# Patient Record
Sex: FEMALE | Race: WHITE
[De-identification: names, ages, dates, MRNs, and addresses within clinical notes are randomized per-mention and may not be internally consistent; named-entity substitution may affect disease eponyms.]

---

## 2018-06-24 ENCOUNTER — HOSPITAL ENCOUNTER (OUTPATIENT)
Dept: HOSPITAL 89 - OB | Age: 23
Discharge: HOME | End: 2018-06-24
Attending: OBSTETRICS & GYNECOLOGY
Payer: COMMERCIAL

## 2018-06-24 VITALS
BODY MASS INDEX: 33.33 KG/M2 | HEIGHT: 69 IN | BODY MASS INDEX: 33.33 KG/M2 | WEIGHT: 225 LBS | WEIGHT: 225 LBS | HEIGHT: 69 IN

## 2018-06-24 VITALS — DIASTOLIC BLOOD PRESSURE: 83 MMHG | SYSTOLIC BLOOD PRESSURE: 132 MMHG

## 2018-06-24 DIAGNOSIS — O47.03: Primary | ICD-10-CM

## 2018-06-24 DIAGNOSIS — Z3A.36: ICD-10-CM

## 2018-06-24 PROCEDURE — 99213 OFFICE O/P EST LOW 20 MIN: CPT

## 2018-06-24 PROCEDURE — 81001 URINALYSIS AUTO W/SCOPE: CPT

## 2018-07-11 ENCOUNTER — HOSPITAL ENCOUNTER (INPATIENT)
Dept: HOSPITAL 89 - OB | Age: 23
LOS: 1 days | Discharge: HOME | End: 2018-07-12
Attending: OBSTETRICS & GYNECOLOGY | Admitting: OBSTETRICS & GYNECOLOGY
Payer: COMMERCIAL

## 2018-07-11 VITALS — DIASTOLIC BLOOD PRESSURE: 81 MMHG | SYSTOLIC BLOOD PRESSURE: 134 MMHG

## 2018-07-11 VITALS
HEIGHT: 66 IN | BODY MASS INDEX: 36.16 KG/M2 | WEIGHT: 225 LBS | WEIGHT: 225 LBS | BODY MASS INDEX: 36.16 KG/M2 | HEIGHT: 66 IN

## 2018-07-11 VITALS — SYSTOLIC BLOOD PRESSURE: 130 MMHG | DIASTOLIC BLOOD PRESSURE: 68 MMHG

## 2018-07-11 DIAGNOSIS — Z3A.39: ICD-10-CM

## 2018-07-11 DIAGNOSIS — Z23: ICD-10-CM

## 2018-07-11 LAB — PLATELET COUNT, AUTOMATED: 199 K/UL (ref 150–450)

## 2018-07-11 PROCEDURE — 10907ZC DRAINAGE OF AMNIOTIC FLUID, THERAPEUTIC FROM PRODUCTS OF CONCEPTION, VIA NATURAL OR ARTIFICIAL OPENING: ICD-10-PCS | Performed by: OBSTETRICS & GYNECOLOGY

## 2018-07-11 PROCEDURE — 90471 IMMUNIZATION ADMIN: CPT

## 2018-07-11 PROCEDURE — 90707 MMR VACCINE SC: CPT

## 2018-07-11 PROCEDURE — 3E033VJ INTRODUCTION OF OTHER HORMONE INTO PERIPHERAL VEIN, PERCUTANEOUS APPROACH: ICD-10-PCS | Performed by: OBSTETRICS & GYNECOLOGY

## 2018-07-11 PROCEDURE — 85025 COMPLETE CBC W/AUTO DIFF WBC: CPT

## 2018-07-11 PROCEDURE — 86850 RBC ANTIBODY SCREEN: CPT

## 2018-07-11 PROCEDURE — 0KQM0ZZ REPAIR PERINEUM MUSCLE, OPEN APPROACH: ICD-10-PCS | Performed by: OBSTETRICS & GYNECOLOGY

## 2018-07-11 PROCEDURE — 86901 BLOOD TYPING SEROLOGIC RH(D): CPT

## 2018-07-11 PROCEDURE — 36415 COLL VENOUS BLD VENIPUNCTURE: CPT

## 2018-07-11 PROCEDURE — 86900 BLOOD TYPING SEROLOGIC ABO: CPT

## 2018-07-11 PROCEDURE — 85027 COMPLETE CBC AUTOMATED: CPT

## 2018-07-11 RX ADMIN — IBUPROFEN SCH MG: 800 TABLET ORAL at 21:08

## 2018-07-11 RX ADMIN — DOCUSATE CALCIUM SCH MG: 240 CAPSULE, LIQUID FILLED ORAL at 21:07

## 2018-07-11 NOTE — OB DELIVERY NOTE
Delivery Note


Vaginal Delivery Type:  Spont. Vaginal Delivery


Delivery Date:  2018


Delivery Time:  18:01


Estimated Gestational Age(wks):  39.3


Delivery Anesthesia:  Epidural


Infant Sex:  Male


[place] Apgars:  1 Minute (8), 5 Minute (9)


Repair Needed:  Laceration, Vaginal, Labial, 2nd Degree


Estimated Blood Loss:  500


Delivery Complications:  Hemorrhage, Laceration


Notes:


Presented for IOL as planned. Pitocin titrated to contraction frequency and 

strength.  Was 3 cm on admission and progressed to 5 cm by 1504.  SROM at 1130.

  Epidural placed at 1300.  Pt was 10cm and +1 station by 1654 when started 

pushing.  Effective pushing and brought baby to  station in OSCAR 

position.  Delivered over second degree laceration with right vaginal extension 

and left labia laceration.  Placenta delivered intact and spontaneous with a 

large gush of blood.  Pitocin infused rapidly IV.  It took some time to perform 

a complex repair of the perineum, vagina and labia left which accounted for the 

blood loss.  No further complications following successful repair.


Pediatrician in Attendence:  No


Copies to:   GABE CHOUDHURY MD, TRAVIS MD 2018 18:49

## 2018-07-12 VITALS — DIASTOLIC BLOOD PRESSURE: 69 MMHG | SYSTOLIC BLOOD PRESSURE: 126 MMHG

## 2018-07-12 VITALS — SYSTOLIC BLOOD PRESSURE: 125 MMHG | DIASTOLIC BLOOD PRESSURE: 68 MMHG

## 2018-07-12 VITALS — SYSTOLIC BLOOD PRESSURE: 118 MMHG | DIASTOLIC BLOOD PRESSURE: 60 MMHG

## 2018-07-12 VITALS — SYSTOLIC BLOOD PRESSURE: 114 MMHG | DIASTOLIC BLOOD PRESSURE: 59 MMHG

## 2018-07-12 VITALS — DIASTOLIC BLOOD PRESSURE: 79 MMHG | SYSTOLIC BLOOD PRESSURE: 145 MMHG

## 2018-07-12 LAB — PLATELET COUNT, AUTOMATED: 188 K/UL (ref 150–450)

## 2018-07-12 RX ADMIN — DOCUSATE CALCIUM SCH MG: 240 CAPSULE, LIQUID FILLED ORAL at 07:59

## 2018-07-12 RX ADMIN — IBUPROFEN SCH MG: 800 TABLET ORAL at 07:59

## 2018-07-12 NOTE — ANESTHESIA POST EVAL NOTE
Anesthesia Post Eval Note


[place], afebrile.


Pt able to participate in Eval:  Yes


Cardiovascular Status:  Satisfactory


Respiratory Status:  Satisfactory


Pain Managment:  Satisfactory


PO Nausea/Vomiting:  Satisfactory


Temperature Management:  Satisfactory


Mental Status:  Satisfactory, Alert, Oriented X3


Post-Op Hydration Status:  Satisfactory, Tolerating PO Well, Voiding w/o 

Difficulty


Anesthesia Type:  LEB


Anesthesia Tolerance:


SAL effective up to and through delivery.  She had some lingering numbness/

tingling of toes on right foot, but that has resolved after ambulating today.  

No symptoms PDPH.











SEAN SHERIDAN CRNA Jul 12, 2018 13:43

## 2018-07-12 NOTE — OB/GYN DISCHARGE SUMMARY
Discharge Summary


Reason for Hosp/Final Diag:  


(1) 39 weeks gestation of pregnancy


Lates Vital Signs





Vital Signs








  Date Time  Temp Pulse Resp B/P (MAP) Pulse Ox O2 Delivery O2 Flow Rate FiO2


 


7/12/18 03:00 97.6 105 16 118/60 (79) 94 Room Air  








Weight (Pounds):  225


Result Diagram:  


7/12/18 0630





Condition:  Improved


Discharge:  Home, Self Care


Home Meds


Active Scripts


[Apap/Hydrocodone 325/7.5 Tab] 7.5 MG/325 MG TAB No Conflict Check, 1-2 EACH PO 

Q4-6H Y for PAIN, #14 TAB 0 Refills


   Prov:ANIL CHINCHILLA MD         7/12/18


Reported Medications


Prenatal Vits W-Ca,Fe,Fa(<1MG) (PRENATAL VITAMINS) 1 Each Tablet, 1 EACH PO 

DAILY, TAB


   6/24/18


Cetirizine Hcl (ZYRTEC) 10 Mg Tablet, 10 MG PO QDAY, TAB


   6/24/18


Follow up Referrals:  


OB/GYN - In 6 Weeks @ [facility] with Anil Chinchilla Md





Follow up with:  Dr. Chinchilla [phone]


Follow up in:  6 wks PP or PO


Discharge Diet:  As Tolerates


Discharge Activity:  As Tolerates, No Heavy Lifting x 6 wks, No Heavy Lifting > 

10lb, Pelvic Rest


Copies to:   ANIL CHINCHILLA MD, TRAVIS MD Jul 12, 2018 08:38

## 2018-07-12 NOTE — PROCEDURE NOTE
Anesthetic Placement Note


Anesthesia Plan:  LEB


Permit for Anesthesia Signed:  Yes


Anesthesia Technique:  Patient Sitting


Anesthesia Prep:  Betadine


Interspace:  L 3-4


Local Anesthetic:  1% Lidocaine, 25 Gauge Needle


Amount Local - cc's:  5


Anesthesia Needle:  17g Touhy/Schliff


Anesthesia Attempts:  3


Loss of Resistance:  Normal Saline


Depth of ARIANNE (cm):  8


Catheter Insertion (cm):  6


Catheter Type:  Jose - Spring Wound


Epidural Dressing:  Tegaderm, Tape, Adhesive Spray


Anesthesia Tray:  Lot Number [id number]), Expiration Date (2019-07-31), 

Reference Number ([id number])


Comment:


Midline approach L4-5, ARIANNE 8cm, epicath threaded easily but heme present.  

Catheter W/D in 1cm increments, flushed to clear blood with NS, but blood still 

present at 2cm depth.  Catheter W/D intact, clotted with blood.  Next attempt L3

-4, ARIANNE 8 cm, attempted SAB access with 27 ga sprotte, pt had paresthesia, 

needle W/D.  Able to thread epicath easily, no heme, no paresthesia, negative 

aspiration.  Test dose negative, sterile dressing, taped in place.





Anesthesia Medications:


Epidural Test Dose:  1.5 Lido/Epi (1:200,000), Dose - mL (3), Time (1311), 

Negative (no symptoms IV or IT injection.)


Epidural Loading Dose:  0.2% Ropivicaine, With Fentanyl 2mcg/ml, Dose - ml (

15ml in 5ml increments), Time (1317)


Epidural Infusion:  0.2% Ropivicaine, With Fentanyl 2mcg/ml, Start Time: (1323)


Epidural Pump Setting:  Bolus Dose - mL (4), Lockout - Minutes (15), 

Maintenance Rate - mL/hr (10), Maximum per Hour - mL (26)


Complications:  On first attempt epicath entered blood vessel, paresthesia when 

attempting spinal portion of CSE.


Comment:


Initially after loading doses complete, pt reported more pronounced sensory/

motor block on Rt side.  Positioned on Lt side, infusion started.  15 minutes 

later, pt reports block is more even, effective analgesia, VSS.











SEAN SHERIDAN CRNA Jul 11, 2018 14:15

## 2018-07-12 NOTE — OB/GYN PROGRESS NOTE
OB Subjective


Progress Notes


Subjective


Doing well.  Pain controlled and ambulating.  Voiding well. Bleeding light


:  Voiding Well


Pain:  Mild





OB Objective


Physical Exam





Vital Signs








  Date Time  Temp Pulse Resp B/P (MAP) Pulse Ox O2 Delivery O2 Flow Rate FiO2


 


7/12/18 03:00 97.6 105 16 118/60 (79) 94 Room Air  








General Appearance:  Alert/Awake/No Acute Distress


Neurological:  No Gross deficits


Respiratory:  No Respiratory Distress


Abdomen:  Soft, Non-Tender, Non-Distended, Fundus Firm


Integumentary:  Skin Intact without Lesions or Rash


Psychological:  Alert & Oriented X3, Appropriate Mood & Affect


Result Diagram:  


7/12/18 0630








Assessment and Plan


OB/GYN Plan:  Routine Post-Partum Care, Discharge Home Today


Problems:  


(1) 39 weeks gestation of pregnancy











GABE CHOUDHURY MD Jul 12, 2018 08:34

## 2018-07-12 NOTE — ANESTHESIA OB PRE-ANES EVAL
History of Present Illness


Anesthesia Start Date:  2018


Anesthesia Start Time:  12:30


OB Anesthesia Diagnosis:  induction - elective, spontaneous ROM


Current Pregnancy Complication:  obesity


EDC:  2018


:  1


Para:  0


Pain Ratin


Result Diagram:  


18 0548





Height (Inches):  66.00


Weight (Pounds):  225


BMI Calculated:  36.31





Past Medical History


Medical History:  no pertinent history


Surgical History:  noncontributory


Previous Anesthesia:  general


Attended Childbirth Classes?:  Yes, Attended CRNA Lecture (yes)


Hx Anesthesia Reactions:  No


Hx Family Anesthesia Reaction:  No


Home Meds


Active Scripts


[Apap/Hydrocodone 325/7.5 Tab] 7.5 MG/325 MG TAB No Conflict Check, 1-2 EACH PO 

Q4-6H Y for PAIN, #14 TAB 0 Refills


   Prov:GABE CHOUDHURY MD         18


Reported Medications


Prenatal Vits W-Ca,Fe,Fa(<1MG) (PRENATAL VITAMINS) 1 Each Tablet, 1 EACH PO 

DAILY, TAB


   18


Cetirizine Hcl (ZYRTEC) 10 Mg Tablet, 10 MG PO QDAY, TAB


   18


Allergies:  


Coded Allergies:  


     No Known Drug Allergies (Unverified , 18)





Anesthesia OB ROS


Airway Class:  l


GI ROS:  clear liquids, ice chips


Last Solids Date:  2018


Last Solids Time:  04:00


ASA Classification:  2





Assessment and Plan


Anesthesia Plan:  LEB


Anesthesia Stop Day:  2018


Anesthesia Stop Time:  18:30


Epidural Catheter Removal:  Removed by: (Removed by RN following delivery, no 

reported complications.)











SEAN SHERIDAN CRNA 2018 14:03

## 2019-06-21 ENCOUNTER — HOSPITAL ENCOUNTER (OUTPATIENT)
Dept: HOSPITAL 89 - LAB | Age: 24
End: 2019-06-21
Attending: OBSTETRICS & GYNECOLOGY
Payer: COMMERCIAL

## 2019-06-21 VITALS — BODY MASS INDEX: 36.31 KG/M2

## 2019-06-21 DIAGNOSIS — Z34.81: Primary | ICD-10-CM

## 2019-06-21 LAB — PLATELET COUNT, AUTOMATED: 230 K/UL (ref 150–450)

## 2019-06-21 PROCEDURE — 87340 HEPATITIS B SURFACE AG IA: CPT

## 2019-06-21 PROCEDURE — 86762 RUBELLA ANTIBODY: CPT

## 2019-06-21 PROCEDURE — 85025 COMPLETE CBC W/AUTO DIFF WBC: CPT

## 2019-06-21 PROCEDURE — 86850 RBC ANTIBODY SCREEN: CPT

## 2019-06-21 PROCEDURE — 87088 URINE BACTERIA CULTURE: CPT

## 2019-06-21 PROCEDURE — 86900 BLOOD TYPING SEROLOGIC ABO: CPT

## 2019-06-21 PROCEDURE — 81001 URINALYSIS AUTO W/SCOPE: CPT

## 2019-06-21 PROCEDURE — 86592 SYPHILIS TEST NON-TREP QUAL: CPT

## 2019-06-21 PROCEDURE — 86703 HIV-1/HIV-2 1 RESULT ANTBDY: CPT

## 2019-06-21 PROCEDURE — 36415 COLL VENOUS BLD VENIPUNCTURE: CPT

## 2019-06-21 PROCEDURE — 86901 BLOOD TYPING SEROLOGIC RH(D): CPT

## 2019-07-01 ENCOUNTER — HOSPITAL ENCOUNTER (OUTPATIENT)
Dept: HOSPITAL 89 - LAB | Age: 24
End: 2019-07-01
Attending: OBSTETRICS & GYNECOLOGY
Payer: COMMERCIAL

## 2019-07-01 VITALS — BODY MASS INDEX: 36.31 KG/M2

## 2019-07-01 DIAGNOSIS — Z34.01: Primary | ICD-10-CM

## 2019-07-01 PROCEDURE — 87591 N.GONORRHOEAE DNA AMP PROB: CPT

## 2019-07-01 PROCEDURE — 87491 CHLMYD TRACH DNA AMP PROBE: CPT

## 2023-05-24 NOTE — HISTORY & PHYSICAL
History of Present Illness


Age of Patient:  23


:  1


Para or TPAL:  0


EDC per LMP:  2018


Estimated Gestational Age:  39


Chief Complaint


IOL


History of Present Illness


Presents for scheduled elective IOL at term.  No complications or issues with 

this pregnancy.  Pt is AB pos and GBS negative.  Past Medical, Surgical, Family 

and Obstetric Histories reviewed. Please see [facility] prenatal chart.





History


Allergies:  


Coded Allergies:  


     No Known Drug Allergies (Unverified , 18)





Med Rec


Home Meds


Reported Medications


Prenatal Vits W-Ca,Fe,Fa(<1MG) (PRENATAL VITAMINS) 1 Each Tablet, 1 EACH PO 

DAILY, TAB


   18


Cetirizine Hcl (ZYRTEC) 10 Mg Tablet, 10 MG PO QDAY, TAB


   18





Review of Systems


All Systems Reviewed/Normal:  Yes, Except as Noted





Exam


General Exam


Vital Signs





Vital Signs








  Date Time  Temp Pulse Resp B/P (MAP) Pulse Ox O2 Delivery O2 Flow Rate FiO2


 


18 07:12  119 15 134/81 (98) 96 Room Air  








General Apperance:  Alert/Awake/No Acute Distress


Neuro:  No Gross deficits


Cardiovascular:  Regular Rate and Rhythm


Respiratory:  No Respiratory Distress


Abdomen:  Soft, Non-Tender, Non-Distended, Gravid - Non-Tender


Psychological:  Alert & Oriented X3, Appropriate Mood & Affect





Pregnancy


Cervical Dialation:  3


Cervical Effacement (%):  80


Cervical Consistency:  Soft


Cervical Position:  Mid


Fetal Station:  -2


Fetal Presentation:  Vertex





Fetus


Fetal Heart Tone Variabilty:  Moderate


FHT Accelerations:  15X15


FHT Category:  I





Medical Decision Making


Data Points


Result Diagram:  


18 0548








VTE Prophylasis: Adult


Deep Vein Thrombosis/Pulmonary:  No


Pharmacological Contraindicati:  Pt at Low Risk for VTE


Mechanical Contraindications:  Pt at Low Risk for VTE





Assessment and Plan


OB/GYN Plan:  Routine Labor/Induct Care


Problems:  


(1) 39 weeks gestation of pregnancy


Assessment & Plan:  Proceed with Pitocin IOL as planned and consented.  Pt 

understands risks and benefits as explained.  No questions.














GABE CHOUDHURY MD 2018 18:43 bilateral UE Active ROM was WFL  (within functional limits)/bilateral UE Passive ROM was WFL  (within functional limits)